# Patient Record
Sex: MALE | Race: WHITE | NOT HISPANIC OR LATINO | Employment: OTHER | URBAN - METROPOLITAN AREA
[De-identification: names, ages, dates, MRNs, and addresses within clinical notes are randomized per-mention and may not be internally consistent; named-entity substitution may affect disease eponyms.]

---

## 2018-04-23 ENCOUNTER — CONVERSION ENCOUNTER (OUTPATIENT)
Dept: FAMILY MEDICINE CLINIC | Facility: CLINIC | Age: 48
End: 2018-04-23

## 2018-04-23 ENCOUNTER — OFFICE VISIT CONVERTED (OUTPATIENT)
Dept: FAMILY MEDICINE CLINIC | Facility: CLINIC | Age: 48
End: 2018-04-23
Attending: NURSE PRACTITIONER

## 2018-08-21 ENCOUNTER — OFFICE VISIT CONVERTED (OUTPATIENT)
Dept: FAMILY MEDICINE CLINIC | Facility: CLINIC | Age: 48
End: 2018-08-21
Attending: NURSE PRACTITIONER

## 2018-11-13 ENCOUNTER — OFFICE VISIT CONVERTED (OUTPATIENT)
Dept: FAMILY MEDICINE CLINIC | Facility: CLINIC | Age: 48
End: 2018-11-13
Attending: NURSE PRACTITIONER

## 2018-11-28 ENCOUNTER — OFFICE VISIT CONVERTED (OUTPATIENT)
Dept: FAMILY MEDICINE CLINIC | Facility: CLINIC | Age: 48
End: 2018-11-28
Attending: NURSE PRACTITIONER

## 2018-12-06 ENCOUNTER — OFFICE VISIT CONVERTED (OUTPATIENT)
Dept: ORTHOPEDIC SURGERY | Facility: CLINIC | Age: 48
End: 2018-12-06
Attending: ORTHOPAEDIC SURGERY

## 2019-01-15 ENCOUNTER — OFFICE VISIT CONVERTED (OUTPATIENT)
Dept: ORTHOPEDIC SURGERY | Facility: CLINIC | Age: 49
End: 2019-01-15
Attending: ORTHOPAEDIC SURGERY

## 2019-01-21 ENCOUNTER — HOSPITAL ENCOUNTER (OUTPATIENT)
Dept: MRI IMAGING | Facility: HOSPITAL | Age: 49
Discharge: HOME | End: 2019-01-21
Attending: ORTHOPAEDIC SURGERY

## 2019-01-24 ENCOUNTER — OFFICE VISIT CONVERTED (OUTPATIENT)
Dept: ORTHOPEDIC SURGERY | Facility: CLINIC | Age: 49
End: 2019-01-24
Attending: ORTHOPAEDIC SURGERY

## 2019-02-08 ENCOUNTER — HOSPITAL ENCOUNTER (OUTPATIENT)
Dept: GASTROENTEROLOGY | Facility: HOSPITAL | Age: 49
Setting detail: HOSPITAL OUTPATIENT SURGERY
Discharge: HOME | End: 2019-02-08
Attending: INTERNAL MEDICINE

## 2019-03-07 ENCOUNTER — OFFICE VISIT CONVERTED (OUTPATIENT)
Dept: ORTHOPEDIC SURGERY | Facility: CLINIC | Age: 49
End: 2019-03-07
Attending: PHYSICIAN ASSISTANT

## 2019-03-07 ENCOUNTER — OFFICE VISIT CONVERTED (OUTPATIENT)
Dept: NEUROSURGERY | Facility: CLINIC | Age: 49
End: 2019-03-07
Attending: PHYSICIAN ASSISTANT

## 2019-03-13 ENCOUNTER — HOSPITAL ENCOUNTER (OUTPATIENT)
Dept: MRI IMAGING | Facility: HOSPITAL | Age: 49
Discharge: HOME | End: 2019-03-13
Attending: PHYSICIAN ASSISTANT

## 2019-03-20 ENCOUNTER — OFFICE VISIT CONVERTED (OUTPATIENT)
Dept: NEUROSURGERY | Facility: CLINIC | Age: 49
End: 2019-03-20
Attending: PHYSICIAN ASSISTANT

## 2019-09-19 ENCOUNTER — OFFICE VISIT CONVERTED (OUTPATIENT)
Dept: ORTHOPEDIC SURGERY | Facility: CLINIC | Age: 49
End: 2019-09-19
Attending: PHYSICIAN ASSISTANT

## 2019-09-19 ENCOUNTER — CONVERSION ENCOUNTER (OUTPATIENT)
Dept: ORTHOPEDIC SURGERY | Facility: CLINIC | Age: 49
End: 2019-09-19

## 2019-09-26 ENCOUNTER — OFFICE VISIT CONVERTED (OUTPATIENT)
Dept: NEUROSURGERY | Facility: CLINIC | Age: 49
End: 2019-09-26
Attending: PHYSICIAN ASSISTANT

## 2019-09-27 ENCOUNTER — HOSPITAL ENCOUNTER (OUTPATIENT)
Dept: URGENT CARE | Facility: CLINIC | Age: 49
Discharge: HOME | End: 2019-09-27

## 2019-10-04 ENCOUNTER — HOSPITAL ENCOUNTER (OUTPATIENT)
Dept: MRI IMAGING | Facility: HOSPITAL | Age: 49
Discharge: HOME | End: 2019-10-04
Attending: PHYSICIAN ASSISTANT

## 2019-11-06 ENCOUNTER — OFFICE VISIT CONVERTED (OUTPATIENT)
Dept: SURGERY | Facility: CLINIC | Age: 49
End: 2019-11-06
Attending: SURGERY

## 2019-11-12 ENCOUNTER — CONVERSION ENCOUNTER (OUTPATIENT)
Dept: NEUROLOGY | Facility: CLINIC | Age: 49
End: 2019-11-12

## 2019-11-12 ENCOUNTER — OFFICE VISIT CONVERTED (OUTPATIENT)
Dept: NEUROSURGERY | Facility: CLINIC | Age: 49
End: 2019-11-12
Attending: NEUROLOGICAL SURGERY

## 2019-11-18 ENCOUNTER — OFFICE VISIT CONVERTED (OUTPATIENT)
Dept: GASTROENTEROLOGY | Facility: CLINIC | Age: 49
End: 2019-11-18
Attending: INTERNAL MEDICINE

## 2019-11-22 ENCOUNTER — HOSPITAL ENCOUNTER (OUTPATIENT)
Dept: OTHER | Facility: HOSPITAL | Age: 49
Discharge: HOME | End: 2019-11-22
Attending: INTERNAL MEDICINE

## 2019-11-22 LAB
BASOPHILS # BLD AUTO: 0.1 10*3/UL (ref 0–0.2)
BASOPHILS NFR BLD AUTO: 1.4 % (ref 0–3)
CONV ABS IMM GRAN: 0.03 10*3/UL (ref 0–0.2)
CONV IMMATURE GRAN: 0.4 % (ref 0–1.8)
DEPRECATED RDW RBC AUTO: 45.5 FL (ref 35.1–43.9)
EOSINOPHIL # BLD AUTO: 0.33 10*3/UL (ref 0–0.7)
EOSINOPHIL # BLD AUTO: 4.5 % (ref 0–7)
ERYTHROCYTE [DISTWIDTH] IN BLOOD BY AUTOMATED COUNT: 13.2 % (ref 11.6–14.4)
HCT VFR BLD AUTO: 33.6 % (ref 42–52)
HGB BLD-MCNC: 10.3 G/DL (ref 14–18)
LYMPHOCYTES # BLD AUTO: 2.28 10*3/UL (ref 1–5)
LYMPHOCYTES NFR BLD AUTO: 31.4 % (ref 20–45)
MCH RBC QN AUTO: 28.9 PG (ref 27–31)
MCHC RBC AUTO-ENTMCNC: 30.7 G/DL (ref 33–37)
MCV RBC AUTO: 94.4 FL (ref 80–96)
MONOCYTES # BLD AUTO: 0.46 10*3/UL (ref 0.2–1.2)
MONOCYTES NFR BLD AUTO: 6.3 % (ref 3–10)
NEUTROPHILS # BLD AUTO: 4.06 10*3/UL (ref 2–8)
NEUTROPHILS NFR BLD AUTO: 56 % (ref 30–85)
NRBC CBCN: 0 % (ref 0–0.7)
PLATELET # BLD AUTO: 283 10*3/UL (ref 130–400)
PMV BLD AUTO: 10.5 FL (ref 9.4–12.4)
RBC # BLD AUTO: 3.56 10*6/UL (ref 4.7–6.1)
WBC # BLD AUTO: 7.26 10*3/UL (ref 4.8–10.8)

## 2019-12-12 ENCOUNTER — HOSPITAL ENCOUNTER (OUTPATIENT)
Dept: OTHER | Facility: HOSPITAL | Age: 49
Discharge: HOME | End: 2019-12-12
Attending: NURSE PRACTITIONER

## 2019-12-12 LAB
BASOPHILS # BLD AUTO: 0.11 10*3/UL (ref 0–0.2)
BASOPHILS NFR BLD AUTO: 1.5 % (ref 0–3)
CONV ABS IMM GRAN: 0.02 10*3/UL (ref 0–0.2)
CONV IMMATURE GRAN: 0.3 % (ref 0–1.8)
DEPRECATED RDW RBC AUTO: 45.7 FL (ref 35.1–43.9)
EOSINOPHIL # BLD AUTO: 0.31 10*3/UL (ref 0–0.7)
EOSINOPHIL # BLD AUTO: 4.3 % (ref 0–7)
ERYTHROCYTE [DISTWIDTH] IN BLOOD BY AUTOMATED COUNT: 13.6 % (ref 11.6–14.4)
FERRITIN SERPL-MCNC: 6 NG/ML (ref 30–300)
FOLATE SERPL-MCNC: 12.2 NG/ML (ref 4.8–20)
HCT VFR BLD AUTO: 35.5 % (ref 42–52)
HGB BLD-MCNC: 10.7 G/DL (ref 14–18)
IRON SATN MFR SERPL: 6 % (ref 20–55)
IRON SERPL-MCNC: 31 UG/DL (ref 70–180)
LYMPHOCYTES # BLD AUTO: 2.18 10*3/UL (ref 1–5)
LYMPHOCYTES NFR BLD AUTO: 30.5 % (ref 20–45)
MCH RBC QN AUTO: 27.5 PG (ref 27–31)
MCHC RBC AUTO-ENTMCNC: 30.1 G/DL (ref 33–37)
MCV RBC AUTO: 91.3 FL (ref 80–96)
MONOCYTES # BLD AUTO: 0.43 10*3/UL (ref 0.2–1.2)
MONOCYTES NFR BLD AUTO: 6 % (ref 3–10)
NEUTROPHILS # BLD AUTO: 4.09 10*3/UL (ref 2–8)
NEUTROPHILS NFR BLD AUTO: 57.4 % (ref 30–85)
NRBC CBCN: 0 % (ref 0–0.7)
PLATELET # BLD AUTO: 286 10*3/UL (ref 130–400)
PMV BLD AUTO: 11 FL (ref 9.4–12.4)
RBC # BLD AUTO: 3.89 10*6/UL (ref 4.7–6.1)
TIBC SERPL-MCNC: 493 UG/DL (ref 245–450)
TRANSFERRIN SERPL-MCNC: 345 MG/DL (ref 215–365)
WBC # BLD AUTO: 7.14 10*3/UL (ref 4.8–10.8)

## 2020-01-10 ENCOUNTER — HOSPITAL ENCOUNTER (OUTPATIENT)
Dept: OTHER | Facility: HOSPITAL | Age: 50
Discharge: HOME | End: 2020-01-10
Attending: NURSE PRACTITIONER

## 2020-01-10 LAB
BASOPHILS # BLD AUTO: 0.09 10*3/UL (ref 0–0.2)
BASOPHILS NFR BLD AUTO: 1.4 % (ref 0–3)
CONV ABS IMM GRAN: 0.01 10*3/UL (ref 0–0.2)
CONV IMMATURE GRAN: 0.2 % (ref 0–1.8)
DEPRECATED RDW RBC AUTO: 49.1 FL (ref 35.1–43.9)
EOSINOPHIL # BLD AUTO: 0.33 10*3/UL (ref 0–0.7)
EOSINOPHIL # BLD AUTO: 5.2 % (ref 0–7)
ERYTHROCYTE [DISTWIDTH] IN BLOOD BY AUTOMATED COUNT: 15.4 % (ref 11.6–14.4)
HCT VFR BLD AUTO: 37.8 % (ref 42–52)
HGB BLD-MCNC: 11.5 G/DL (ref 14–18)
IRON SATN MFR SERPL: 5 % (ref 20–55)
IRON SERPL-MCNC: 25 UG/DL (ref 70–180)
LYMPHOCYTES # BLD AUTO: 1.95 10*3/UL (ref 1–5)
LYMPHOCYTES NFR BLD AUTO: 31 % (ref 20–45)
MCH RBC QN AUTO: 26.6 PG (ref 27–31)
MCHC RBC AUTO-ENTMCNC: 30.4 G/DL (ref 33–37)
MCV RBC AUTO: 87.3 FL (ref 80–96)
MONOCYTES # BLD AUTO: 0.4 10*3/UL (ref 0.2–1.2)
MONOCYTES NFR BLD AUTO: 6.3 % (ref 3–10)
NEUTROPHILS # BLD AUTO: 3.52 10*3/UL (ref 2–8)
NEUTROPHILS NFR BLD AUTO: 55.9 % (ref 30–85)
NRBC CBCN: 0 % (ref 0–0.7)
PLATELET # BLD AUTO: 282 10*3/UL (ref 130–400)
PMV BLD AUTO: 10.9 FL (ref 9.4–12.4)
RBC # BLD AUTO: 4.33 10*6/UL (ref 4.7–6.1)
TIBC SERPL-MCNC: 488 UG/DL (ref 245–450)
TRANSFERRIN SERPL-MCNC: 341 MG/DL (ref 215–365)
WBC # BLD AUTO: 6.3 10*3/UL (ref 4.8–10.8)

## 2020-01-14 ENCOUNTER — HOSPITAL ENCOUNTER (OUTPATIENT)
Dept: GASTROENTEROLOGY | Facility: HOSPITAL | Age: 50
Setting detail: HOSPITAL OUTPATIENT SURGERY
Discharge: HOME | End: 2020-01-14
Attending: INTERNAL MEDICINE

## 2020-01-24 ENCOUNTER — OFFICE VISIT CONVERTED (OUTPATIENT)
Dept: GASTROENTEROLOGY | Facility: CLINIC | Age: 50
End: 2020-01-24
Attending: NURSE PRACTITIONER

## 2020-03-12 ENCOUNTER — HOSPITAL ENCOUNTER (OUTPATIENT)
Dept: LAB | Facility: HOSPITAL | Age: 50
Discharge: HOME | End: 2020-03-12
Attending: NURSE PRACTITIONER

## 2020-08-19 ENCOUNTER — OFFICE VISIT CONVERTED (OUTPATIENT)
Dept: GASTROENTEROLOGY | Facility: CLINIC | Age: 50
End: 2020-08-19
Attending: NURSE PRACTITIONER

## 2020-08-19 ENCOUNTER — HOSPITAL ENCOUNTER (OUTPATIENT)
Dept: LAB | Facility: HOSPITAL | Age: 50
Discharge: HOME | End: 2020-08-19
Attending: NURSE PRACTITIONER

## 2020-08-19 LAB
BASOPHILS # BLD AUTO: 0.09 10*3/UL (ref 0–0.2)
BASOPHILS NFR BLD AUTO: 1.4 % (ref 0–3)
CONV ABS IMM GRAN: 0.03 10*3/UL (ref 0–0.2)
CONV IMMATURE GRAN: 0.5 % (ref 0–1.8)
DEPRECATED RDW RBC AUTO: 50.7 FL (ref 35.1–43.9)
EOSINOPHIL # BLD AUTO: 0.16 10*3/UL (ref 0–0.7)
EOSINOPHIL # BLD AUTO: 2.6 % (ref 0–7)
ERYTHROCYTE [DISTWIDTH] IN BLOOD BY AUTOMATED COUNT: 15.9 % (ref 11.6–14.4)
HCT VFR BLD AUTO: 33 % (ref 42–52)
HGB BLD-MCNC: 9.6 G/DL (ref 14–18)
IRON SATN MFR SERPL: 5 % (ref 20–55)
IRON SERPL-MCNC: 26 UG/DL (ref 70–180)
LYMPHOCYTES # BLD AUTO: 1.8 10*3/UL (ref 1–5)
LYMPHOCYTES NFR BLD AUTO: 28.8 % (ref 20–45)
MCH RBC QN AUTO: 25.2 PG (ref 27–31)
MCHC RBC AUTO-ENTMCNC: 29.1 G/DL (ref 33–37)
MCV RBC AUTO: 86.6 FL (ref 80–96)
MONOCYTES # BLD AUTO: 0.48 10*3/UL (ref 0.2–1.2)
MONOCYTES NFR BLD AUTO: 7.7 % (ref 3–10)
NEUTROPHILS # BLD AUTO: 3.7 10*3/UL (ref 2–8)
NEUTROPHILS NFR BLD AUTO: 59 % (ref 30–85)
NRBC CBCN: 0 % (ref 0–0.7)
PLATELET # BLD AUTO: 297 10*3/UL (ref 130–400)
PMV BLD AUTO: 10 FL (ref 9.4–12.4)
RBC # BLD AUTO: 3.81 10*6/UL (ref 4.7–6.1)
TIBC SERPL-MCNC: 573 UG/DL (ref 245–450)
TRANSFERRIN SERPL-MCNC: 401 MG/DL (ref 215–365)
WBC # BLD AUTO: 6.26 10*3/UL (ref 4.8–10.8)

## 2020-08-21 ENCOUNTER — HOSPITAL ENCOUNTER (OUTPATIENT)
Dept: CT IMAGING | Facility: HOSPITAL | Age: 50
Discharge: HOME | End: 2020-08-21
Attending: NURSE PRACTITIONER

## 2020-08-25 ENCOUNTER — HOSPITAL ENCOUNTER (OUTPATIENT)
Dept: INFUSION THERAPY | Facility: HOSPITAL | Age: 50
Setting detail: RECURRING SERIES
Discharge: HOME | End: 2020-09-01
Attending: NURSE PRACTITIONER

## 2020-09-11 ENCOUNTER — HOSPITAL ENCOUNTER (OUTPATIENT)
Dept: CT IMAGING | Facility: HOSPITAL | Age: 50
Discharge: HOME | End: 2020-09-11
Attending: NURSE PRACTITIONER

## 2020-09-24 ENCOUNTER — HOSPITAL ENCOUNTER (OUTPATIENT)
Dept: LAB | Facility: HOSPITAL | Age: 50
Discharge: HOME | End: 2020-09-24
Attending: NURSE PRACTITIONER

## 2020-09-24 LAB
BASOPHILS # BLD AUTO: 0.05 10*3/UL (ref 0–0.2)
BASOPHILS NFR BLD AUTO: 1 % (ref 0–3)
CONV ABS IMM GRAN: 0.01 10*3/UL (ref 0–0.2)
CONV ANISOCYTES: SLIGHT
CONV HYPOCHROMIA IN BLOOD BY LIGHT MICROSCOPY: NORMAL
CONV IMMATURE GRAN: 0.2 % (ref 0–1.8)
DEPRECATED RDW RBC AUTO: 73.5 FL (ref 35.1–43.9)
EOSINOPHIL # BLD AUTO: 0.29 10*3/UL (ref 0–0.7)
EOSINOPHIL # BLD AUTO: 5.6 % (ref 0–7)
ERYTHROCYTE [DISTWIDTH] IN BLOOD BY AUTOMATED COUNT: 22.4 % (ref 11.6–14.4)
HCT VFR BLD AUTO: 46.8 % (ref 42–52)
HGB BLD-MCNC: 14 G/DL (ref 14–18)
IRON SATN MFR SERPL: 15 % (ref 20–55)
IRON SERPL-MCNC: 64 UG/DL (ref 70–180)
LYMPHOCYTES # BLD AUTO: 1.71 10*3/UL (ref 1–5)
LYMPHOCYTES NFR BLD AUTO: 32.9 % (ref 20–45)
MACROCYTES BLD QL SMEAR: NORMAL
MCH RBC QN AUTO: 27.3 PG (ref 27–31)
MCHC RBC AUTO-ENTMCNC: 29.9 G/DL (ref 33–37)
MCV RBC AUTO: 91.4 FL (ref 80–96)
MONOCYTES # BLD AUTO: 0.45 10*3/UL (ref 0.2–1.2)
MONOCYTES NFR BLD AUTO: 8.7 % (ref 3–10)
NEUTROPHILS # BLD AUTO: 2.68 10*3/UL (ref 2–8)
NEUTROPHILS NFR BLD AUTO: 51.6 % (ref 30–85)
NRBC CBCN: 0 % (ref 0–0.7)
PLATELET # BLD AUTO: 199 10*3/UL (ref 130–400)
PMV BLD AUTO: 12 FL (ref 9.4–12.4)
RBC # BLD AUTO: 5.12 10*6/UL (ref 4.7–6.1)
SPHEROCYTES BLD QL SMEAR: SLIGHT
TIBC SERPL-MCNC: 426 UG/DL (ref 245–450)
TRANSFERRIN SERPL-MCNC: 298 MG/DL (ref 215–365)
WBC # BLD AUTO: 5.19 10*3/UL (ref 4.8–10.8)

## 2020-10-12 ENCOUNTER — HOSPITAL ENCOUNTER (OUTPATIENT)
Dept: PREADMISSION TESTING | Facility: HOSPITAL | Age: 50
Discharge: HOME | End: 2020-10-12
Attending: INTERNAL MEDICINE

## 2020-10-13 LAB — SARS-COV-2 RNA SPEC QL NAA+PROBE: NOT DETECTED

## 2020-10-15 ENCOUNTER — HOSPITAL ENCOUNTER (OUTPATIENT)
Dept: GASTROENTEROLOGY | Facility: HOSPITAL | Age: 50
Setting detail: HOSPITAL OUTPATIENT SURGERY
Discharge: HOME | End: 2020-10-15
Attending: INTERNAL MEDICINE

## 2020-12-21 ENCOUNTER — PROCEDURE VISIT CONVERTED (OUTPATIENT)
Dept: GASTROENTEROLOGY | Facility: CLINIC | Age: 50
End: 2020-12-21
Attending: NURSE PRACTITIONER

## 2020-12-29 ENCOUNTER — OFFICE VISIT CONVERTED (OUTPATIENT)
Dept: GASTROENTEROLOGY | Facility: CLINIC | Age: 50
End: 2020-12-29
Attending: NURSE PRACTITIONER

## 2020-12-29 ENCOUNTER — HOSPITAL ENCOUNTER (OUTPATIENT)
Dept: LAB | Facility: HOSPITAL | Age: 50
Discharge: HOME | End: 2020-12-29
Attending: NURSE PRACTITIONER

## 2020-12-29 LAB
BASOPHILS # BLD AUTO: 0.06 10*3/UL (ref 0–0.2)
BASOPHILS NFR BLD AUTO: 1 % (ref 0–3)
CONV ABS IMM GRAN: 0.03 10*3/UL (ref 0–0.2)
CONV IMMATURE GRAN: 0.5 % (ref 0–1.8)
DEPRECATED RDW RBC AUTO: 46.5 FL (ref 35.1–43.9)
EOSINOPHIL # BLD AUTO: 0.13 10*3/UL (ref 0–0.7)
EOSINOPHIL # BLD AUTO: 2.1 % (ref 0–7)
ERYTHROCYTE [DISTWIDTH] IN BLOOD BY AUTOMATED COUNT: 13.5 % (ref 11.6–14.4)
HCT VFR BLD AUTO: 44 % (ref 42–52)
HGB BLD-MCNC: 14.4 G/DL (ref 14–18)
IRON SATN MFR SERPL: 22 % (ref 20–55)
IRON SERPL-MCNC: 91 UG/DL (ref 70–180)
LYMPHOCYTES # BLD AUTO: 1.76 10*3/UL (ref 1–5)
LYMPHOCYTES NFR BLD AUTO: 28.1 % (ref 20–45)
MCH RBC QN AUTO: 31 PG (ref 27–31)
MCHC RBC AUTO-ENTMCNC: 32.7 G/DL (ref 33–37)
MCV RBC AUTO: 94.8 FL (ref 80–96)
MONOCYTES # BLD AUTO: 0.45 10*3/UL (ref 0.2–1.2)
MONOCYTES NFR BLD AUTO: 7.2 % (ref 3–10)
NEUTROPHILS # BLD AUTO: 3.83 10*3/UL (ref 2–8)
NEUTROPHILS NFR BLD AUTO: 61.1 % (ref 30–85)
NRBC CBCN: 0 % (ref 0–0.7)
PLATELET # BLD AUTO: 215 10*3/UL (ref 130–400)
PMV BLD AUTO: 10.7 FL (ref 9.4–12.4)
RBC # BLD AUTO: 4.64 10*6/UL (ref 4.7–6.1)
TIBC SERPL-MCNC: 418 UG/DL (ref 245–450)
TRANSFERRIN SERPL-MCNC: 292 MG/DL (ref 215–365)
WBC # BLD AUTO: 6.26 10*3/UL (ref 4.8–10.8)

## 2021-05-10 NOTE — PROCEDURES
Procedure Note      Patient Name: Sebas Lee   Patient ID: [id number]   Sex: Male   YOB: 1970    Primary Care Provider: Margareth CARVALHO   Referring Provider: Margareth CARVALHO    Visit Date: December 21, 2020    Provider: MAIA Martinez   [facility]   Location Address: [address] [place] [address], [place] [id number]   Location Phone: [phone]          Sebas Lee presents today for Capsule Endoscopy Procedure for anemia. He successfully swallowed capsule without difficulty or complications.           Assessment  · H/O endoscopy     V45.89/Z98.890  · Anemia due to blood loss     280.0/D50.0      Plan  · Orders  o Capsule endoscopy esophagus through ileum [facility] (48933) - - 12/21/2020  · Instructions  o Please see Capsule endoscopy report scanned into patient record.             Electronically Signed by: Angeli Bland, -Author on December 21, 2020 08:44:02 AM

## 2021-05-13 NOTE — PROGRESS NOTES
Progress Note      Patient Name: Sebas Lee   Patient ID: [id number]   Sex: Male   YOB: 1970    Primary Care Provider: Margareth CARVALHO    Visit Date: August 19, 2020    Provider: MAIA Blanton   Location: [facility]   Location Address: [address] [place] [address], [address]  [place]  [id number]   Location Phone: [phone]          Chief Complaint  · Inpatient Follow Up      History Of Present Illness     Patient recently inpatient at [facility] and then transferred to [facility] for GI bleed. He began to noticed bright red rectal bleeding for a few days before he decided to go to hospital. After transfer to [facility] he had a colonoscopy where source of bleed was found and clipped. Capsule endoscopy was also done with no source of small bowel bleeding. Requesting records.     Bowel movement 1-2x daily, formed stool. Denies any current or recent hematochezia or melena. Does reports occasional generalized abdominal pain. Pain is sharp and does not last for very long. Can not correlate the pain to anything specific. He is very anxious and tearful as he is very healthy otherwise and wonders why he keeps getting GI bleeds as this is his second in a year.     Heartburn controlled with protonix. Denies dysphagia, nausea, or vomiting. Appetite and weight stable. Taking oral iron once daily. Avoiding all NSAIDS.     Patient recently had his water tested at his home and found it was very contaminated. His source of water is primarily from a well. He has since shocked his well system with bleach and added water filtration systems all throughout his home.      EGD 6/28/2020: Normal duodenum.  Esophageal hiatal hernia.  Healed scar from previous gastric surgery in mid gastric body.  Colonoscopy 6/28/2020: Blood in the whole colon.  Diverticulosis of the transverse colon, descending colon and sigmoid colon.  Normal mucosa in the terminal ileum.  Patient was transferred to  [facility].    H. pylori breath test 3/12/2020: Negative.           Past Medical History  Degenerative Disc Disease ; Displacement of lumbar intervertebral disc; Elevated blood pressure reading without diagnosis of hypertension; Hyperlipemia; Hypertriglyceridemia; Impaired fasting glucose; Lumbago/low back pain; Lumbago/low back pain; Lumbar disc herniation, L4-5; Lumbar Spinal Stenosis; Radiculopathy, lumbosacral; Tobacco Abuse, Chronic         Past Surgical History  Back surgery; Colonoscopy; EGD; Minimally invasive Discectomy; Vasectomy         Medication List  potassium chloride 20 mEq oral tablet extended release; Protonix 20 mg oral tablet,delayed release (DR/EC)         Allergy List  NO KNOWN DRUG ALLERGIES       Allergies Reconciled  Family Medical History  Diabetes, unspecified type; - No Family History of Colorectal Cancer         Social History  Alcohol (Current some day); lives with spouse; ; Recreational Drug Use (Current some day); Tobacco (Former); Unemployed; Working         Review of Systems  · Constitutional  o Denies  o : chills, fever  · Cardiovascular  o Denies  o : chest pain, dyspnea on exertion  · Respiratory  o Denies  o : cough, shortness of breath  · Gastrointestinal  o Admits  o : see HPI   · Endocrine  o Denies  o : weight gain, weight loss      Vitals  Date Time BP Position Site L\R Cuff Size HR RR TEMP (F) WT  HT  BMI kg/m2 BSA m2 O2 Sat HC       08/19/2020 08:09 /85 Sitting    65 - R   219lbs 4oz 6'  2\" 28.15 2.28           Physical Examination  · Constitutional  o Appearance  o : Healthy-appearing, awake and alert in no acute distress  · Head and Face  o Head  o : Normocephalic with no worriesome skin lesions  · Eyes  o Vision  o :   § Visual Fields  § : eyes move symmetrical in all directions  o Sclerae  o : sclerae anicteric  o Pupils and Irises  o : pupils equal and symmetrical  · Neck  o Inspection/Palpation  o : Trachea is midline, no " adenopathy  · Respiratory  o Respiratory Effort  o : Breathing is unlabored.  o Inspection of Chest  o : normal appearance  o Auscultation of Lungs  o : Chest is clear to auscultation bilaterally.  · Cardiovascular  o Heart  o :   § Auscultation of Heart  § : no murmurs, rubs, or gallops  o Peripheral Vascular System  o :   § Extremities  § : no cyanosis, clubbing or edema;   · Gastrointestinal  o Abdominal Examination  o : mild left-upper quadrant tenderness to palpation present, normal bowel sounds, tone normal without rigidity or guarding, no masses present, abdomen scaphoid upon supine  o Digital Rectal Exam  o : deferred  · Skin and Subcutaneous Tissue  o General Inspection  o : without focal lesions; turgor is normal  · Psychiatric  o General  o : Alert and oriented x3  o Mood and Affect  o : Mood and affect are appropriate to circumstances          Assessment  · Abdominal Pain, Generalized     789.07/R10.84  · History of ulcer of large intestine     V12.79/Z87.19    Problems Reconciled  Plan  · Orders  o CBC with Auto Diff [facility] (62400) - - 08/19/2020  o CT Abdomen and Pelvis with IV Contrast [facility]; suggest Oral Prep (13520) - - 08/19/2020  o Iron Profile (Iron 25168 TIBC 33746 and Transferrin 81929) (IRONP) - - 08/19/2020  · Medications  o Medications have been Reconciled  o Transition of Care or Provider Policy  · Instructions  o Information given on current diagnoses.  o Lifestyle modifications discussed.  o Discussed risks of long-term PPI use.  o Instructed patient to notify office ASAP of any rectal bleeding or worsening abdominal pain.  o Electronically Identified Patient Education Materials Provided Electronically  · Disposition  o Call or Return if symptoms worsen or persist.  o 3 month f/u            Electronically Signed by: Margarita Hines APRN -Author on August 19, 2020 08:47:43 AM

## 2021-05-14 VITALS
BODY MASS INDEX: 31.07 KG/M2 | WEIGHT: 242.12 LBS | DIASTOLIC BLOOD PRESSURE: 70 MMHG | SYSTOLIC BLOOD PRESSURE: 109 MMHG | HEART RATE: 76 BPM | HEIGHT: 74 IN

## 2021-05-14 NOTE — PROGRESS NOTES
"   Progress Note      Patient Name: Sebas Lee   Patient ID: [id number]   Sex: Male   YOB: 1970    Primary Care Provider: Margareth CARVALHO   Referring Provider: Margareth CARVALHO    Visit Date: December 29, 2020    Provider: MAIA Blanton   Location: [facility]   Location Address: [address] [place] [address], [address]  [place], [place]  [id number]   Location Phone: [phone]          Chief Complaint  · Follow up of EGD/Colonoscopy      History Of Present Illness     Patient reports that he is feeling well.  He denies any abdominal pain, hematochezia, melena, nausea, or vomiting.  Appetite and weight stable.  Having a bowel movement 1-2 times daily, formed stool.    He has not been on Protonix for the last 6 months and states that he is does well unless eating copious amounts of sugars and he feels breakthrough heartburn.  Also reports he has not had any oral iron since Injectafer infusion.  Has been trying to get his iron through a \"natural means\" such as green leafy vegetables and beans.  He is also been attending sweat lodge ceremonies at [facility].  He feels that these have helped him cleanse his body of \"impurities\".    Appetite weight stable.    CBC 12/28/2020: WBC 6.26, hemoglobin 14.4, hematocrit 44, platelets 215.  Iron profile 12/29/2020: Iron 91, total iron-binding [place] 418, percent saturation 22%, transferrin 292.    Capsule endoscopy 12/21/2020: Gastritis with erosions.  3 gastric AVMs ranging from 3 to 4 mm.  1 lymphangiectasia, location distal jejunum.  Full tiny red spot/telangiectasia, location proximal duodenum.  Unlikely to account for patient's symptoms.    EGD 10/15/2020: Normal mucosa of duodenum.  Erythema in the antrum.  Normal mucosa in whole esophagus.  Scar tissue noted along the posterior wall of gastric body.  Colonoscopy 10/15/2020: Mild diverticulosis of whole colon.  Normal TI.  4 mm polyp in sigmoid colon.  " "Grade 2 internal hemorrhoids.  Sigmoid polyphyperplastic polyp.  Antrum biopsymild chronic gastritis.  Stomach biopsychronic gastritis with intestinal metaplasia.         Past Medical History  Degenerative Disc Disease ; Displacement of lumbar intervertebral disc; Elevated blood pressure reading without diagnosis of hypertension; Hyperlipemia; Hypertriglyceridemia; Impaired fasting glucose; Lumbago/low back pain; Lumbago/low back pain; Lumbar disc herniation, L4-5; Lumbar Spinal Stenosis; Radiculopathy, lumbosacral; Tobacco Abuse, Chronic         Past Surgical History  Back surgery; Colonoscopy; EGD; Minimally invasive Discectomy; Vasectomy         Medication List  potassium chloride 20 mEq oral tablet extended release; Protonix 20 mg oral tablet,delayed release (DR/EC)         Allergy List  NO KNOWN DRUG ALLERGIES       Allergies Reconciled  Family Medical History  Diabetes, unspecified type; - No Family History of Colorectal Cancer         Social History  Alcohol (Current some day); lives with spouse; ; Recreational Drug Use (Current some day); Tobacco (Former); Unemployed; Working         Review of Systems  · Constitutional  o Denies  o : chills, fever  · Cardiovascular  o Denies  o : chest pain, dyspnea on exertion  · Respiratory  o Denies  o : cough, shortness of breath  · Gastrointestinal  o Admits  o : see HPI   · Endocrine  o Admits  o : weight gain  o Denies  o : weight loss      Vitals  Date Time BP Position Site L\R Cuff Size HR RR TEMP (F) WT  HT  BMI kg/m2 BSA m2 O2 Sat FR L/min FiO2 HC       12/29/2020 02:48 /70 Sitting    76 - R   242lbs 2oz 6'  2\" 31.09 2.39             Physical Examination  · Constitutional  o Appearance  o : Healthy-appearing, awake and alert in no acute distress  · Head and Face  o Head  o : Normocephalic with no worriesome skin lesions  · Eyes  o Vision  o :   § Visual Fields  § : eyes move symmetrical in all directions  o Sclerae  o : sclerae anicteric  o Pupils and " Irises  o : pupils equal and symmetrical  · Neck  o Inspection/Palpation  o : Trachea is midline, no adenopathy  · Respiratory  o Respiratory Effort  o : Breathing is unlabored.  o Inspection of Chest  o : normal appearance  o Auscultation of Lungs  o : Chest is clear to auscultation bilaterally.  · Cardiovascular  o Heart  o :   § Auscultation of Heart  § : no murmurs, rubs, or gallops  o Peripheral Vascular System  o :   § Extremities  § : no cyanosis, clubbing or edema;   · Gastrointestinal  o Abdominal Examination  o : Abdomen is soft, nontender to palpation, with normal active bowel sounds, no appreciable hepatosplenomegaly.  o Digital Rectal Exam  o : deferred  · Skin and Subcutaneous Tissue  o General Inspection  o : without focal lesions; turgor is normal  · Psychiatric  o General  o : Alert and oriented x3  o Mood and Affect  o : Mood and affect are appropriate to circumstances          Assessment  · Colon polyps     211.3/K63.5  · Diverticulosis Of Colon     562.10/K57.30  · Gastritis, Other specified     535.40  · Internal hemorrhoids     455.0/K64.8      Plan  · Medications  o Medications have been Reconciled  o Transition of Care or Provider Policy  · Instructions  o Information given on current diagnoses.  o Lifestyle modifications discussed.  o Electronically Identified Patient Education Materials Provided Electronically  · Disposition  o Follow up PRN-Call if any change in bowel pattern, abdominal pain, rectal bleeding, or any new GI complaint            Electronically Signed by: MAIA Blanton -Author on December 29, 2020 03:20:06 PM

## 2021-05-15 VITALS
WEIGHT: 213.5 LBS | HEIGHT: 74 IN | DIASTOLIC BLOOD PRESSURE: 66 MMHG | BODY MASS INDEX: 27.4 KG/M2 | SYSTOLIC BLOOD PRESSURE: 107 MMHG

## 2021-05-15 VITALS — WEIGHT: 200 LBS | BODY MASS INDEX: 25.67 KG/M2 | RESPIRATION RATE: 14 BRPM | HEIGHT: 74 IN

## 2021-05-15 VITALS
HEART RATE: 65 BPM | DIASTOLIC BLOOD PRESSURE: 85 MMHG | WEIGHT: 219.25 LBS | SYSTOLIC BLOOD PRESSURE: 125 MMHG | BODY MASS INDEX: 28.14 KG/M2 | HEIGHT: 74 IN

## 2021-05-15 VITALS — HEIGHT: 74 IN | WEIGHT: 228 LBS | BODY MASS INDEX: 29.26 KG/M2 | HEART RATE: 74 BPM

## 2021-05-15 VITALS
HEIGHT: 74 IN | SYSTOLIC BLOOD PRESSURE: 107 MMHG | BODY MASS INDEX: 26.89 KG/M2 | DIASTOLIC BLOOD PRESSURE: 69 MMHG | WEIGHT: 209.5 LBS

## 2021-05-15 VITALS
DIASTOLIC BLOOD PRESSURE: 99 MMHG | WEIGHT: 202.12 LBS | HEIGHT: 74 IN | SYSTOLIC BLOOD PRESSURE: 147 MMHG | BODY MASS INDEX: 25.94 KG/M2

## 2021-05-15 VITALS
DIASTOLIC BLOOD PRESSURE: 61 MMHG | WEIGHT: 219.5 LBS | HEIGHT: 74 IN | HEART RATE: 83 BPM | BODY MASS INDEX: 28.17 KG/M2 | SYSTOLIC BLOOD PRESSURE: 94 MMHG

## 2021-05-15 VITALS — WEIGHT: 207 LBS | HEIGHT: 74 IN | HEART RATE: 68 BPM | BODY MASS INDEX: 26.56 KG/M2 | OXYGEN SATURATION: 97 %

## 2021-05-16 VITALS — HEIGHT: 74 IN | BODY MASS INDEX: 26.95 KG/M2 | WEIGHT: 210 LBS

## 2021-05-16 VITALS
BODY MASS INDEX: 26.63 KG/M2 | TEMPERATURE: 98.9 F | OXYGEN SATURATION: 97 % | HEIGHT: 74 IN | WEIGHT: 207.5 LBS | HEART RATE: 93 BPM | DIASTOLIC BLOOD PRESSURE: 70 MMHG | SYSTOLIC BLOOD PRESSURE: 102 MMHG

## 2021-05-16 VITALS
BODY MASS INDEX: 28.36 KG/M2 | HEART RATE: 76 BPM | SYSTOLIC BLOOD PRESSURE: 138 MMHG | HEIGHT: 74 IN | DIASTOLIC BLOOD PRESSURE: 88 MMHG | TEMPERATURE: 97.3 F | WEIGHT: 221 LBS | OXYGEN SATURATION: 97 %

## 2021-05-16 VITALS — HEIGHT: 74 IN | BODY MASS INDEX: 28.88 KG/M2 | WEIGHT: 225 LBS | OXYGEN SATURATION: 98 % | HEART RATE: 85 BPM

## 2021-05-16 VITALS — OXYGEN SATURATION: 99 % | HEIGHT: 74 IN | BODY MASS INDEX: 28.88 KG/M2 | HEART RATE: 87 BPM | WEIGHT: 225 LBS

## 2021-05-16 VITALS
BODY MASS INDEX: 26.82 KG/M2 | HEIGHT: 74 IN | TEMPERATURE: 98 F | DIASTOLIC BLOOD PRESSURE: 68 MMHG | SYSTOLIC BLOOD PRESSURE: 116 MMHG | WEIGHT: 209 LBS | OXYGEN SATURATION: 98 % | HEART RATE: 110 BPM

## 2021-05-16 VITALS
OXYGEN SATURATION: 99 % | BODY MASS INDEX: 27.51 KG/M2 | TEMPERATURE: 97.9 F | HEART RATE: 69 BPM | HEIGHT: 74 IN | SYSTOLIC BLOOD PRESSURE: 112 MMHG | WEIGHT: 214.37 LBS | DIASTOLIC BLOOD PRESSURE: 72 MMHG

## 2021-05-16 VITALS — HEART RATE: 62 BPM | BODY MASS INDEX: 28.88 KG/M2 | OXYGEN SATURATION: 97 % | HEIGHT: 74 IN | WEIGHT: 225 LBS

## 2021-05-16 VITALS — OXYGEN SATURATION: 98 % | HEART RATE: 68 BPM | WEIGHT: 225 LBS | HEIGHT: 74 IN | BODY MASS INDEX: 28.88 KG/M2

## 2022-01-24 ENCOUNTER — TELEPHONE (OUTPATIENT)
Dept: NEUROLOGY | Facility: OTHER | Age: 52
End: 2022-01-24

## 2022-01-24 NOTE — TELEPHONE ENCOUNTER
[facility] TRANSFERRED THE CALL TO THE OFFICE -PT CALLED ASKING TO BE SEEN-PT STATES THAT HE WOKE UP WITH SEVERE PAIN IN HIS RIGHT LEG-PT STATES HE CAN NOT USE HIS LEG-PT REPORTED HAVING TO DRAG HIS RIGHT LEG-TRANSFERRED TO [facility] DUE TO SUDDEN ONSET OF SYMPTOMS

## 2022-01-24 NOTE — TELEPHONE ENCOUNTER
Patient asked to set up appt with dr. Gorge sharp. I was able to transfer call to Cathy in neurosurgery department.

## 2022-01-26 ENCOUNTER — OFFICE VISIT (OUTPATIENT)
Dept: NEUROSURGERY | Facility: CLINIC | Age: 52
End: 2022-01-26

## 2022-01-26 VITALS
WEIGHT: 222.8 LBS | HEIGHT: 74 IN | SYSTOLIC BLOOD PRESSURE: 105 MMHG | BODY MASS INDEX: 28.59 KG/M2 | DIASTOLIC BLOOD PRESSURE: 67 MMHG

## 2022-01-26 DIAGNOSIS — M54.41 ACUTE RIGHT-SIDED LOW BACK PAIN WITH RIGHT-SIDED SCIATICA: ICD-10-CM

## 2022-01-26 DIAGNOSIS — Z98.890 HISTORY OF LUMBAR SURGERY: ICD-10-CM

## 2022-01-26 DIAGNOSIS — M51.36 DDD (DEGENERATIVE DISC DISEASE), LUMBAR: Primary | ICD-10-CM

## 2022-01-26 PROCEDURE — 99213 OFFICE O/P EST LOW 20 MIN: CPT | Performed by: PHYSICIAN ASSISTANT

## 2022-01-26 RX ORDER — HYDROCODONE BITARTRATE AND ACETAMINOPHEN 5; 325 MG/1; MG/1
TABLET ORAL
COMMUNITY
Start: 2022-01-24

## 2022-01-26 RX ORDER — CYCLOBENZAPRINE HCL 10 MG
10 TABLET ORAL
COMMUNITY
Start: 2022-01-24

## 2022-01-26 NOTE — PROGRESS NOTES
Patient being seen for today for Leg Pain  .    Subjective    Sebas Lee is a 51 y.o. male that presents with Leg Pain  .    HPI  Previously: Last seen on 11/12/2019 for complaints of low back pain, at that time he described intermittent flareups of low back pain more than leg pain, there was a review of his MRI of lumbar spine from 10/4/2019 which showed new/enlarged L4-L5 disc herniation, he was recommended to have core strengthening, smoking cessation and avoidance of activities which worsen the pain, he was instructed to call for any worsened left leg pain.      Today :He complains of severe pain in the right leg with weakness in the right leg starting when he woke up on 1/24/2022. This started acutely. He does report he has seen some improvement over the past few days with starting of flexeril by his PCP. His pain is rated 9/10 today. Pain does radiate down to the bottom of the right foot and into the middle toes. This is described as a throbbing pain. This pain is intermittent and waxing and waning. He reports this does not bother him more in a specific time of the day. Nothing other than flexeril has seemed to help his pain. He reports pain is worsened by moving around.    He reports associated tingling, mostly in the right buttock and down the right leg, weakness in the right leg. He denies loss of bowel or bladder control.    He denies any other treatment other than flexeril.    He has had a previous surgery 5-6 years ago in the lumbar spine at L4-L5.     reports that he has been smoking. He has been smoking about 1.00 pack per day. He has never used smokeless tobacco.    Review of Systems   Musculoskeletal: Positive for back pain.   Neurological: Positive for weakness and numbness.       Objective   Vitals:    01/26/22 0948   BP: 105/67        Physical Exam  Constitutional:       Appearance: Normal appearance. He is normal weight.   Pulmonary:      Effort: Pulmonary effort is normal.    Musculoskeletal:         General: No tenderness.      Comments: SLR on right causes pain in right lower back and hip.   Neurological:      General: No focal deficit present.      Mental Status: He is alert and oriented to person, place, and time.      Sensory: No sensory deficit.      Motor: No weakness.      Deep Tendon Reflexes: Reflexes normal.   Psychiatric:         Mood and Affect: Mood normal.         Behavior: Behavior normal.          Result Review   I have personally reviewed the radiology report for MRI of lumbar spine from 10/4/2019 which showed new or enlarged L4-L5 disc herniation.       Assessment and Plan {CC Problem List  Visit Diagnosis  ROS  Review (Popup)  [facility] [place]  Quality  BestPractice  Medications  SmartSets  SnapShot Encounters  Media :23}   Diagnoses and all orders for this visit:    1. DDD (degenerative disc disease), lumbar (Primary)  -     MRI Lumbar Spine With & Without Contrast; Future    2. Acute right-sided low back pain with right-sided sciatica  -     MRI Lumbar Spine With & Without Contrast; Future    3. History of lumbar surgery  -     MRI Lumbar Spine With & Without Contrast; Future    As he is reporting new pain in the right leg and has a positive straight leg raise on the right, I do recommend evaluation with new MRI of lumbar spine, as he has previous surgical history at L4-L5 I would recommend with and without contrast. I will place order today.    The patient was counseled on basic recommendations for the reduction and prevention of back, neck, or spine pain in association with spinal disorders, including: cessation/avoidance of nicotine use, maintenance of a healthy BMI and weight, focusing on building/maintaining core strength through core exercise, and avoidance of activities which worsen the pain. The patient will monitor for changes in symptoms and notify our clinic of these changes as needed.    He will follow-up here in about 6 weeks to reassess  and review imaging, sooner if needed.    Follow Up {Instructions Charge Capture  Follow-up Communications :23}   Return in about 6 weeks (around 3/9/2022).

## 2022-01-27 ENCOUNTER — TRANSCRIBE ORDERS (OUTPATIENT)
Dept: ADMINISTRATIVE | Facility: HOSPITAL | Age: 52
End: 2022-01-27

## 2022-01-27 DIAGNOSIS — M54.41 ACUTE BACK PAIN WITH SCIATICA, RIGHT: ICD-10-CM

## 2022-01-27 DIAGNOSIS — M51.36 DDD (DEGENERATIVE DISC DISEASE), LUMBAR: ICD-10-CM

## 2022-02-09 ENCOUNTER — HOSPITAL ENCOUNTER (OUTPATIENT)
Dept: MRI IMAGING | Facility: HOSPITAL | Age: 52
Discharge: HOME | End: 2022-02-09
Admitting: NURSE PRACTITIONER

## 2022-02-09 DIAGNOSIS — M51.36 DDD (DEGENERATIVE DISC DISEASE), LUMBAR: ICD-10-CM

## 2022-02-09 DIAGNOSIS — M54.41 ACUTE BACK PAIN WITH SCIATICA, RIGHT: ICD-10-CM

## 2022-02-09 PROCEDURE — 72148 MRI LUMBAR SPINE W/O DYE: CPT

## 2022-03-09 ENCOUNTER — OFFICE VISIT (OUTPATIENT)
Dept: NEUROSURGERY | Facility: CLINIC | Age: 52
End: 2022-03-09

## 2022-03-09 VITALS
HEART RATE: 82 BPM | BODY MASS INDEX: 28.36 KG/M2 | HEIGHT: 74 IN | WEIGHT: 221 LBS | SYSTOLIC BLOOD PRESSURE: 120 MMHG | DIASTOLIC BLOOD PRESSURE: 82 MMHG

## 2022-03-09 DIAGNOSIS — Z98.890 HISTORY OF LUMBAR SURGERY: ICD-10-CM

## 2022-03-09 DIAGNOSIS — M54.41 ACUTE RIGHT-SIDED LOW BACK PAIN WITH RIGHT-SIDED SCIATICA: ICD-10-CM

## 2022-03-09 DIAGNOSIS — M51.26 HERNIATED NUCLEUS PULPOSUS, L4-5 RIGHT: Primary | ICD-10-CM

## 2022-03-09 PROCEDURE — 99214 OFFICE O/P EST MOD 30 MIN: CPT | Performed by: PHYSICIAN ASSISTANT

## 2022-03-09 NOTE — PROGRESS NOTES
Patient being seen for today for Back Pain  .    Subjective    Sebas Lee is a 51 y.o. male that presents with Back Pain  .    HPI  Previously: Last seen on 1/26/2022 for new pain in the right leg, he had a positive straight leg raise, there is an order for new MRI of the lumbar spine with and without contrast which she did complete on 2/9/2022.    Today: He does continue to have pain down the right leg to the middle toe of the foot mainly. He rates his pain today 7/10. He reports it is somewhat improved compared to the last time he was here. He does have tingling. He denies any new complaints today.     reports that he has been smoking. He has been smoking about 1.00 pack per day. He has never used smokeless tobacco.    Review of Systems   Musculoskeletal: Positive for back pain.   Neurological: Positive for numbness.       Objective   Vitals:    03/09/22 0932   BP: 120/82   Pulse: 82        Physical Exam  Constitutional:       Appearance: Normal appearance. He is normal weight.   Pulmonary:      Effort: Pulmonary effort is normal.   Musculoskeletal:         General: Tenderness (midline lumbar spine) present.      Comments: SLR on right causes pain in right lower back   Neurological:      General: No focal deficit present.      Mental Status: He is alert and oriented to person, place, and time.      Sensory: No sensory deficit.      Motor: No weakness.      Deep Tendon Reflexes: Reflexes normal.   Psychiatric:         Mood and Affect: Mood normal.         Behavior: Behavior normal.          Result Review   I personally reviewed the MRI lumbar spine with and without contrast from 2/9/2022 which shows multilevel degenerative disc disease and facet arthropathy, this is worse at L4-L5 where there is a slightly right paracentric disc protrusion causing right greater than left recess stenosis and possibly abutting the right traversing L5 nerve root, there is bilateral foraminal narrowing worse on the left.      Assessment and Plan {CC Problem List  Visit Diagnosis  ROS  Review (Popup)  [facility]  Quality  BestPractice  Medications  SmartSets  SnapShot Encounters  Media :23}   Diagnoses and all orders for this visit:    1. Herniated nucleus pulposus, L4-5 right (Primary)    2. Acute right-sided low back pain with right-sided sciatica    3. History of lumbar surgery    His pain seems to be in a right L5 distribution to the middle toes of the foot. He does have some right recess narrowing at L4-L5 possibly abutting the L5 nerve. I think he may benefit from surgery for this, but there is not clear compression of the nerve.    He may benefit from a trial of physical therapy.    He may benefit from a trial of LESB.    He would like to defer these for now in favor of watchful waiting and back rest.    The patient was counseled on basic recommendations for the reduction and prevention of back, neck, or spine pain in association with spinal disorders, including: cessation/avoidance of nicotine use, maintenance of a healthy BMI and weight, focusing on building/maintaining core strength through core exercise, and avoidance of activities which worsen the pain. The patient will monitor for changes in symptoms and notify our clinic of these changes as needed.    He will follow-up here PRN for failure to improve or worsening symptoms.    Follow Up {Instructions Charge Capture  Follow-up Communications :23}   Return if symptoms worsen or fail to improve.